# Patient Record
Sex: MALE | Race: BLACK OR AFRICAN AMERICAN | Employment: UNEMPLOYED | URBAN - METROPOLITAN AREA
[De-identification: names, ages, dates, MRNs, and addresses within clinical notes are randomized per-mention and may not be internally consistent; named-entity substitution may affect disease eponyms.]

---

## 2017-07-31 ENCOUNTER — HOSPITAL ENCOUNTER (OUTPATIENT)
Age: 77
Setting detail: SPECIMEN
Discharge: HOME | End: 2017-07-31
Payer: MEDICARE

## 2017-07-31 LAB
ANION GAP SERPL CALCULATED.3IONS-SCNC: 13 MMOL/L (ref 9–17)
BUN BLDV-MCNC: 55 MG/DL (ref 8–23)
BUN/CREAT BLD: 20 (ref 9–20)
CALCIUM SERPL-MCNC: 8.9 MG/DL (ref 8.6–10.4)
CHLORIDE BLD-SCNC: 103 MMOL/L (ref 98–107)
CO2: 28 MMOL/L (ref 20–31)
CREAT SERPL-MCNC: 2.71 MG/DL (ref 0.7–1.2)
GFR AFRICAN AMERICAN: 28 ML/MIN
GFR NON-AFRICAN AMERICAN: 23 ML/MIN
GFR SERPL CREATININE-BSD FRML MDRD: ABNORMAL ML/MIN/{1.73_M2}
GFR SERPL CREATININE-BSD FRML MDRD: ABNORMAL ML/MIN/{1.73_M2}
GLUCOSE BLD-MCNC: 106 MG/DL (ref 70–99)
HCT VFR BLD CALC: 29.6 % (ref 41–53)
HEMOGLOBIN: 9.7 G/DL (ref 13.5–17.5)
MAGNESIUM: 2.1 MG/DL (ref 1.6–2.6)
MCH RBC QN AUTO: 30.8 PG (ref 26–34)
MCHC RBC AUTO-ENTMCNC: 32.7 G/DL (ref 31–37)
MCV RBC AUTO: 94.4 FL (ref 80–100)
PDW BLD-RTO: 16.5 % (ref 11.5–14.5)
PLATELET # BLD: 158 K/UL (ref 130–400)
PMV BLD AUTO: 8.8 FL (ref 6–12)
POTASSIUM SERPL-SCNC: 4.3 MMOL/L (ref 3.7–5.3)
RBC # BLD: 3.14 M/UL (ref 4.5–5.9)
SODIUM BLD-SCNC: 144 MMOL/L (ref 135–144)
WBC # BLD: 6.1 K/UL (ref 3.5–11)

## 2017-07-31 PROCEDURE — 36415 COLL VENOUS BLD VENIPUNCTURE: CPT

## 2017-07-31 PROCEDURE — P9603 ONE-WAY ALLOW PRORATED MILES: HCPCS

## 2017-07-31 PROCEDURE — 85027 COMPLETE CBC AUTOMATED: CPT

## 2017-07-31 PROCEDURE — 83735 ASSAY OF MAGNESIUM: CPT

## 2017-07-31 PROCEDURE — 80048 BASIC METABOLIC PNL TOTAL CA: CPT

## 2017-08-07 ENCOUNTER — HOSPITAL ENCOUNTER (OUTPATIENT)
Age: 77
Setting detail: SPECIMEN
Discharge: HOME | End: 2017-08-07
Payer: MEDICARE

## 2018-02-18 ENCOUNTER — APPOINTMENT (OUTPATIENT)
Dept: GENERAL RADIOLOGY | Age: 78
DRG: 683 | End: 2018-02-18
Payer: MEDICARE

## 2018-02-18 ENCOUNTER — HOSPITAL ENCOUNTER (INPATIENT)
Age: 78
LOS: 1 days | Discharge: HOME | DRG: 683 | End: 2018-02-20
Attending: EMERGENCY MEDICINE | Admitting: EMERGENCY MEDICINE
Payer: MEDICARE

## 2018-02-18 DIAGNOSIS — N17.9 AKI (ACUTE KIDNEY INJURY) (HCC): ICD-10-CM

## 2018-02-18 DIAGNOSIS — R55 SYNCOPE AND COLLAPSE: Primary | ICD-10-CM

## 2018-02-18 LAB
-: NORMAL
ABSOLUTE EOS #: 0.22 K/UL (ref 0–0.44)
ABSOLUTE IMMATURE GRANULOCYTE: <0.03 K/UL (ref 0–0.3)
ABSOLUTE LYMPH #: 1.28 K/UL (ref 1.1–3.7)
ABSOLUTE MONO #: 0.88 K/UL (ref 0.1–1.2)
AMORPHOUS: NORMAL
ANION GAP SERPL CALCULATED.3IONS-SCNC: 17 MMOL/L (ref 9–17)
BACTERIA: NORMAL
BASOPHILS # BLD: 1 % (ref 0–2)
BASOPHILS ABSOLUTE: 0.03 K/UL (ref 0–0.2)
BILIRUBIN URINE: NEGATIVE
BNP INTERPRETATION: ABNORMAL
BUN BLDV-MCNC: 49 MG/DL (ref 8–23)
BUN/CREAT BLD: ABNORMAL (ref 9–20)
CALCIUM SERPL-MCNC: 8.3 MG/DL (ref 8.6–10.4)
CASTS UA: NORMAL /LPF (ref 0–8)
CHLORIDE BLD-SCNC: 107 MMOL/L (ref 98–107)
CO2: 17 MMOL/L (ref 20–31)
COLOR: YELLOW
COMMENT UA: ABNORMAL
CREAT SERPL-MCNC: 3.82 MG/DL (ref 0.7–1.2)
CRYSTALS, UA: NORMAL /HPF
DIFFERENTIAL TYPE: ABNORMAL
EOSINOPHILS RELATIVE PERCENT: 3 % (ref 1–4)
EPITHELIAL CELLS UA: NORMAL /HPF (ref 0–5)
GFR AFRICAN AMERICAN: 19 ML/MIN
GFR NON-AFRICAN AMERICAN: 15 ML/MIN
GFR SERPL CREATININE-BSD FRML MDRD: ABNORMAL ML/MIN/{1.73_M2}
GFR SERPL CREATININE-BSD FRML MDRD: ABNORMAL ML/MIN/{1.73_M2}
GLUCOSE BLD-MCNC: 104 MG/DL (ref 70–99)
GLUCOSE URINE: NEGATIVE
HCT VFR BLD CALC: 36.2 % (ref 40.7–50.3)
HEMOGLOBIN: 11.3 G/DL (ref 13–17)
IMMATURE GRANULOCYTES: 0 %
KETONES, URINE: NEGATIVE
LEUKOCYTE ESTERASE, URINE: NEGATIVE
LYMPHOCYTES # BLD: 20 % (ref 24–43)
MCH RBC QN AUTO: 30.3 PG (ref 25.2–33.5)
MCHC RBC AUTO-ENTMCNC: 31.2 G/DL (ref 28.4–34.8)
MCV RBC AUTO: 97.1 FL (ref 82.6–102.9)
MONOCYTES # BLD: 14 % (ref 3–12)
MUCUS: NORMAL
NITRITE, URINE: NEGATIVE
NRBC AUTOMATED: 0 PER 100 WBC
OTHER OBSERVATIONS UA: NORMAL
PDW BLD-RTO: 13.9 % (ref 11.8–14.4)
PH UA: 5 (ref 5–8)
PLATELET # BLD: 164 K/UL (ref 138–453)
PLATELET ESTIMATE: ABNORMAL
PMV BLD AUTO: 10.6 FL (ref 8.1–13.5)
POC TROPONIN I: 0.02 NG/ML (ref 0–0.1)
POC TROPONIN INTERP: NORMAL
POTASSIUM SERPL-SCNC: 4.6 MMOL/L (ref 3.7–5.3)
PRO-BNP: 1620 PG/ML
PROTEIN UA: ABNORMAL
RBC # BLD: 3.73 M/UL (ref 4.21–5.77)
RBC # BLD: ABNORMAL 10*6/UL
RBC UA: NORMAL /HPF (ref 0–4)
RENAL EPITHELIAL, UA: NORMAL /HPF
SEG NEUTROPHILS: 62 % (ref 36–65)
SEGMENTED NEUTROPHILS ABSOLUTE COUNT: 3.97 K/UL (ref 1.5–8.1)
SODIUM BLD-SCNC: 141 MMOL/L (ref 135–144)
SPECIFIC GRAVITY UA: 1.01 (ref 1–1.03)
TRICHOMONAS: NORMAL
TROPONIN INTERP: NORMAL
TROPONIN INTERP: NORMAL
TROPONIN T: <0.03 NG/ML
TROPONIN T: <0.03 NG/ML
TURBIDITY: CLEAR
URINE HGB: NEGATIVE
UROBILINOGEN, URINE: NORMAL
WBC # BLD: 6.4 K/UL (ref 3.5–11.3)
WBC # BLD: ABNORMAL 10*3/UL
WBC UA: NORMAL /HPF (ref 0–5)
YEAST: NORMAL

## 2018-02-18 PROCEDURE — 85025 COMPLETE CBC W/AUTO DIFF WBC: CPT

## 2018-02-18 PROCEDURE — 93005 ELECTROCARDIOGRAM TRACING: CPT

## 2018-02-18 PROCEDURE — 6370000000 HC RX 637 (ALT 250 FOR IP): Performed by: EMERGENCY MEDICINE

## 2018-02-18 PROCEDURE — 84484 ASSAY OF TROPONIN QUANT: CPT

## 2018-02-18 PROCEDURE — 94640 AIRWAY INHALATION TREATMENT: CPT

## 2018-02-18 PROCEDURE — 2580000003 HC RX 258: Performed by: EMERGENCY MEDICINE

## 2018-02-18 PROCEDURE — 81015 MICROSCOPIC EXAM OF URINE: CPT

## 2018-02-18 PROCEDURE — 99285 EMERGENCY DEPT VISIT HI MDM: CPT

## 2018-02-18 PROCEDURE — 83880 ASSAY OF NATRIURETIC PEPTIDE: CPT

## 2018-02-18 PROCEDURE — 36415 COLL VENOUS BLD VENIPUNCTURE: CPT

## 2018-02-18 PROCEDURE — G0378 HOSPITAL OBSERVATION PER HR: HCPCS

## 2018-02-18 PROCEDURE — 71046 X-RAY EXAM CHEST 2 VIEWS: CPT

## 2018-02-18 PROCEDURE — 94664 DEMO&/EVAL PT USE INHALER: CPT

## 2018-02-18 PROCEDURE — 81003 URINALYSIS AUTO W/O SCOPE: CPT

## 2018-02-18 PROCEDURE — 80048 BASIC METABOLIC PNL TOTAL CA: CPT

## 2018-02-18 RX ORDER — MAGNESIUM OXIDE 400 MG/1
400 TABLET ORAL DAILY
Status: DISCONTINUED | OUTPATIENT
Start: 2018-02-18 | End: 2018-02-20 | Stop reason: HOSPADM

## 2018-02-18 RX ORDER — IPRATROPIUM BROMIDE AND ALBUTEROL SULFATE 2.5; .5 MG/3ML; MG/3ML
1 SOLUTION RESPIRATORY (INHALATION) EVERY 4 HOURS
Status: DISCONTINUED | OUTPATIENT
Start: 2018-02-18 | End: 2018-02-19

## 2018-02-18 RX ORDER — CLOZAPINE 25 MG/1
25 TABLET ORAL 2 TIMES DAILY
COMMUNITY

## 2018-02-18 RX ORDER — DOCUSATE SODIUM 100 MG/1
100 CAPSULE, LIQUID FILLED ORAL 2 TIMES DAILY
Status: DISCONTINUED | OUTPATIENT
Start: 2018-02-18 | End: 2018-02-20 | Stop reason: HOSPADM

## 2018-02-18 RX ORDER — HYDROCHLOROTHIAZIDE 25 MG/1
25 TABLET ORAL DAILY
Status: DISCONTINUED | OUTPATIENT
Start: 2018-02-18 | End: 2018-02-19

## 2018-02-18 RX ORDER — SODIUM CHLORIDE 0.9 % (FLUSH) 0.9 %
10 SYRINGE (ML) INJECTION EVERY 12 HOURS SCHEDULED
Status: DISCONTINUED | OUTPATIENT
Start: 2018-02-18 | End: 2018-02-20 | Stop reason: HOSPADM

## 2018-02-18 RX ORDER — PANTOPRAZOLE SODIUM 40 MG/1
40 TABLET, DELAYED RELEASE ORAL
Status: DISCONTINUED | OUTPATIENT
Start: 2018-02-19 | End: 2018-02-20 | Stop reason: HOSPADM

## 2018-02-18 RX ORDER — GABAPENTIN 300 MG/1
300 CAPSULE ORAL 3 TIMES DAILY
Status: DISCONTINUED | OUTPATIENT
Start: 2018-02-18 | End: 2018-02-19

## 2018-02-18 RX ORDER — ACETAMINOPHEN 325 MG/1
650 TABLET ORAL EVERY 4 HOURS PRN
Status: DISCONTINUED | OUTPATIENT
Start: 2018-02-18 | End: 2018-02-20 | Stop reason: HOSPADM

## 2018-02-18 RX ORDER — LANOLIN ALCOHOL/MO/W.PET/CERES
325 CREAM (GRAM) TOPICAL 2 TIMES DAILY WITH MEALS
Status: DISCONTINUED | OUTPATIENT
Start: 2018-02-18 | End: 2018-02-20 | Stop reason: HOSPADM

## 2018-02-18 RX ORDER — FINASTERIDE 5 MG/1
5 TABLET, FILM COATED ORAL DAILY
Status: DISCONTINUED | OUTPATIENT
Start: 2018-02-18 | End: 2018-02-20 | Stop reason: HOSPADM

## 2018-02-18 RX ORDER — SIMVASTATIN 5 MG
5 TABLET ORAL NIGHTLY
Status: DISCONTINUED | OUTPATIENT
Start: 2018-02-18 | End: 2018-02-20 | Stop reason: HOSPADM

## 2018-02-18 RX ORDER — CARVEDILOL 12.5 MG/1
12.5 TABLET ORAL 2 TIMES DAILY WITH MEALS
Status: DISCONTINUED | OUTPATIENT
Start: 2018-02-18 | End: 2018-02-20

## 2018-02-18 RX ORDER — M-VIT,TX,IRON,MINS/CALC/FOLIC 27MG-0.4MG
1 TABLET ORAL DAILY
Status: DISCONTINUED | OUTPATIENT
Start: 2018-02-18 | End: 2018-02-20 | Stop reason: HOSPADM

## 2018-02-18 RX ORDER — 0.9 % SODIUM CHLORIDE 0.9 %
500 INTRAVENOUS SOLUTION INTRAVENOUS ONCE
Status: COMPLETED | OUTPATIENT
Start: 2018-02-18 | End: 2018-02-18

## 2018-02-18 RX ORDER — AMLODIPINE BESYLATE 10 MG/1
10 TABLET ORAL DAILY
Status: DISCONTINUED | OUTPATIENT
Start: 2018-02-18 | End: 2018-02-20 | Stop reason: HOSPADM

## 2018-02-18 RX ORDER — OXYCODONE HYDROCHLORIDE 5 MG/1
5 TABLET ORAL EVERY 4 HOURS PRN
Status: DISCONTINUED | OUTPATIENT
Start: 2018-02-18 | End: 2018-02-20 | Stop reason: HOSPADM

## 2018-02-18 RX ORDER — SODIUM CHLORIDE 0.9 % (FLUSH) 0.9 %
10 SYRINGE (ML) INJECTION PRN
Status: DISCONTINUED | OUTPATIENT
Start: 2018-02-18 | End: 2018-02-20 | Stop reason: HOSPADM

## 2018-02-18 RX ORDER — SENNA AND DOCUSATE SODIUM 50; 8.6 MG/1; MG/1
2 TABLET, FILM COATED ORAL DAILY
Status: DISCONTINUED | OUTPATIENT
Start: 2018-02-18 | End: 2018-02-20 | Stop reason: HOSPADM

## 2018-02-18 RX ADMIN — OXYCODONE HYDROCHLORIDE 5 MG: 5 TABLET ORAL at 19:41

## 2018-02-18 RX ADMIN — IPRATROPIUM BROMIDE AND ALBUTEROL SULFATE 3 ML: .5; 3 SOLUTION RESPIRATORY (INHALATION) at 16:18

## 2018-02-18 RX ADMIN — GABAPENTIN 300 MG: 300 CAPSULE ORAL at 21:00

## 2018-02-18 RX ADMIN — IPRATROPIUM BROMIDE AND ALBUTEROL SULFATE 3 ML: .5; 3 SOLUTION RESPIRATORY (INHALATION) at 21:19

## 2018-02-18 RX ADMIN — CARVEDILOL 12.5 MG: 12.5 TABLET, FILM COATED ORAL at 19:42

## 2018-02-18 RX ADMIN — SODIUM CHLORIDE 500 ML: 0.9 INJECTION, SOLUTION INTRAVENOUS at 13:21

## 2018-02-18 RX ADMIN — APIXABAN 5 MG: 5 TABLET, FILM COATED ORAL at 21:00

## 2018-02-18 RX ADMIN — SIMVASTATIN 5 MG: 5 TABLET, FILM COATED ORAL at 21:00

## 2018-02-18 RX ADMIN — Medication 10 ML: at 21:00

## 2018-02-18 RX ADMIN — FERROUS SULFATE TAB EC 325 MG (65 MG FE EQUIVALENT) 325 MG: 325 (65 FE) TABLET DELAYED RESPONSE at 19:41

## 2018-02-18 ASSESSMENT — ENCOUNTER SYMPTOMS
NAUSEA: 0
COLOR CHANGE: 0
SHORTNESS OF BREATH: 0
BACK PAIN: 0
ABDOMINAL PAIN: 0
VOMITING: 0

## 2018-02-18 ASSESSMENT — PAIN SCALES - GENERAL: PAINLEVEL_OUTOF10: 7

## 2018-02-18 NOTE — ED PROVIDER NOTES
[facility]  ED  Emergency Department Encounter  Emergency Medicine Resident     Pt Name: Cara Landeros  MRN: [id number]  [place] 1940  Date of evaluation: 2/18/18  PCP:  No primary care provider on file. CHIEF COMPLAINT       Chief Complaint   Patient presents with    Loss of Consciousness     reports that he had a syncopal episode while at [address] [facility]  (Location/Symptom, Timing/Onset, Context/Setting, Quality, Duration, Modifying Factors, Severity.)      Cara Landeros is a 69 y.o. male who presents with Syncopal episode. Patient was brought in by EMS. Patient states he was at [employer] when he passed out. Patient states he was sitting down and slumped over. He does not know how long he was out for. He didn't fall to the ground or hit his head. Patient denies having any prodromal symptoms prior to passing out. He denies any chest pain, shortness of breath, lightheadedness, palpitations or diaphoresis. He denies any complaints currently. No nausea or vomiting. Patient does have a history of CHF and states he takes water pills. States he has been fluid restricted. He follows up with cardiology. States he does have a stent in and has been taking his meds. He currently denies any headache, blurry vision, neck pain, numbness, weakness, tingling in his extremities. Patient states he has passed out before and he was told it was because he was dehydrated. He denies any leg pain or leg swelling. PAST MEDICAL / SURGICAL / SOCIAL / FAMILY HISTORY      has a past medical history of Atrial fibrillation, chronic (HCC); BPH (benign prostatic hyperplasia); Hyperlipidemia; Hypertension; and Myocardial infarct, old.     has no past surgical history on file. Social History     Social History    Marital status:      Spouse name: N/A    Number of children: N/A    Years of education: N/A     Occupational History    Not on file.      Social History Main Topics    Smoking status: Former Smoker    Smokeless tobacco: Not on file    Alcohol use No    Drug use: No    Sexual activity: Not on file     Other Topics Concern    Not on file     Social History Narrative    No narrative on file       History reviewed. No pertinent family history. Allergies:  Review of patient's allergies indicates no known allergies. Home Medications:  Prior to Admission medications    Medication Sig Start Date End Date Taking?  Authorizing Provider   oxyCODONE (ROXICODONE) 5 MG immediate release tablet Take 1 tablet by mouth every 4 hours as needed for Pain 3/2/16   Emilia Lester MD   ferrous sulfate 325 (65 FE) MG tablet Take 1 tablet by mouth 2 times daily (with meals) 3/2/16   Emilia Lester MD   amLODIPine (NORVASC) 10 MG tablet Take 10 mg by mouth daily    Historical Provider, MD   brimonidine (ALPHAGAN P) 0.1 % SOLN 1 drop 2 times daily    Historical Provider, MD   carvedilol (COREG) 12.5 MG tablet Take 12.5 mg by mouth 2 times daily (with meals)    Historical Provider, MD   finasteride (PROSCAR) 5 MG tablet Take 5 mg by mouth daily    Historical Provider, MD   gabapentin (NEURONTIN) 300 MG capsule Take 300 mg by mouth 3 times daily    Historical Provider, MD   magnesium oxide (MAG-OX) 400 MG tablet Take 400 mg by mouth daily    Historical Provider, MD   latanoprost (XALATAN) 0.005 % ophthalmic solution 1 drop nightly    Historical Provider, MD   Multiple Vitamins-Minerals (THERAPEUTIC MULTIVITAMIN-MINERALS) tablet Take 1 tablet by mouth daily    Historical Provider, MD   pantoprazole sodium (PROTONIX) 40 MG PACK packet Take 40 mg by mouth every morning (before breakfast)    Historical Provider, MD   senna-docusate (Sonia Earnest) 8.6-50 MG per tablet Take 2 tablets by mouth daily    Historical Provider, MD   ipratropium-albuterol (DUONEB) 0.5-2.5 (3) MG/3ML SOLN nebulizer solution Take 1 vial by nebulization every 4 hours    Historical Provider, MD   simvastatin (ZOCOR) 5 MG GFR Staging NOT REPORTED    CBC Auto Differential   Result Value Ref Range    WBC 6.4 3.5 - 11.3 k/uL    RBC 3.73 (L) 4.21 - 5.77 m/uL    Hemoglobin 11.3 (L) 13.0 - 17.0 g/dL    Hematocrit 36.2 (L) 40.7 - 50.3 %    MCV 97.1 82.6 - 102.9 fL    MCH 30.3 25.2 - 33.5 pg    MCHC 31.2 28.4 - 34.8 g/dL    RDW 13.9 11.8 - 14.4 %    Platelets 835 439 - 280 k/uL    MPV 10.6 8.1 - 13.5 fL    NRBC Automated 0.0 0.0 per 100 WBC    Differential Type NOT REPORTED     Seg Neutrophils 62 36 - 65 %    Lymphocytes 20 (L) 24 - 43 %    Monocytes 14 (H) 3 - 12 %    Eosinophils % 3 1 - 4 %    Basophils 1 0 - 2 %    Immature Granulocytes 0 0 %    Segs Absolute 3.97 1.50 - 8.10 k/uL    Absolute Lymph # 1.28 1.10 - 3.70 k/uL    Absolute Mono # 0.88 0.10 - 1.20 k/uL    Absolute Eos # 0.22 0.00 - 0.44 k/uL    Basophils # 0.03 0.00 - 0.20 k/uL    Absolute Immature Granulocyte <0.03 0.00 - 0.30 k/uL    WBC Morphology NOT REPORTED     RBC Morphology NOT REPORTED     Platelet Estimate NOT REPORTED    Urinalysis   Result Value Ref Range    Color, UA YELLOW YEL    Turbidity UA CLEAR CLEAR    Glucose, Ur NEGATIVE NEG    Bilirubin Urine NEGATIVE NEG    Ketones, Urine NEGATIVE NEG    Specific Gravity, UA 1.008 1.005 - 1.030    Urine Hgb NEGATIVE NEG    pH, UA 5.0 5.0 - 8.0    Protein, UA TRACE (A) NEG    Urobilinogen, Urine Normal NORM    Nitrite, Urine NEGATIVE NEG    Leukocyte Esterase, Urine NEGATIVE NEG    Urinalysis Comments       Microscopic exam not performed based on chemical results unless requested in   Brain Natriuretic Peptide   Result Value Ref Range    Pro-BNP 1,620 (H) <300 pg/mL    BNP Interpretation         URINALYSIS, MICRO   Result Value Ref Range    -          WBC, UA None 0 - 5 /HPF    RBC, UA None 0 - 4 /HPF    Casts UA 2 TO 5 HYALINE 0 - 8 /LPF    Crystals UA NOT REPORTED NONE /HPF    Epithelial Cells UA None 0 - 5 /HPF    Renal Epithelial, Urine NOT REPORTED 0 /HPF    Bacteria, UA NOT REPORTED NONE    Mucus, UA NOT REPORTED change  T Waves: Nonspecific changes  Q Waves: none    Clinical Impression: Sinus bradycardia    EMERGENCY DEPARTMENT COURSE:  Patient reassessed and updated on results and plan to admit. Patient was understanding. He sees cardiology at the [facility] clinic. We'll consult our cardiologist regarding patient. PROCEDURES:  None    CONSULTS:  IP CONSULT TO CARDIOLOGY    CRITICAL CARE:  None    FINAL IMPRESSION      1. Syncope and collapse    2. SARA (acute kidney injury) ([address]          DISPOSITION / PLAN     DISPOSITION   decision to admit      PATIENT REFERRED TO:  No follow-up provider specified.     DISCHARGE MEDICATIONS:  New Prescriptions    No medications on file       Duglas Jackson MD  Emergency Medicine Resident    (Please note that portions of this note were completed with a voice recognition program.  Efforts were made to edit the dictations but occasionally words are mis-transcribed.)        Duglas Jackson MD  Resident  02/18/18 [id number]

## 2018-02-19 ENCOUNTER — APPOINTMENT (OUTPATIENT)
Dept: ULTRASOUND IMAGING | Age: 78
DRG: 683 | End: 2018-02-19
Payer: MEDICARE

## 2018-02-19 LAB
ANION GAP SERPL CALCULATED.3IONS-SCNC: 16 MMOL/L (ref 9–17)
BUN BLDV-MCNC: 49 MG/DL (ref 8–23)
BUN/CREAT BLD: ABNORMAL (ref 9–20)
CALCIUM SERPL-MCNC: 8.2 MG/DL (ref 8.6–10.4)
CHLORIDE BLD-SCNC: 108 MMOL/L (ref 98–107)
CO2: 18 MMOL/L (ref 20–31)
CREAT SERPL-MCNC: 3.48 MG/DL (ref 0.7–1.2)
EKG ATRIAL RATE: 59 BPM
EKG ATRIAL RATE: 59 BPM
EKG ATRIAL RATE: 63 BPM
EKG P AXIS: 28 DEGREES
EKG P AXIS: 36 DEGREES
EKG P AXIS: 48 DEGREES
EKG P-R INTERVAL: 168 MS
EKG P-R INTERVAL: 170 MS
EKG P-R INTERVAL: 210 MS
EKG Q-T INTERVAL: 406 MS
EKG Q-T INTERVAL: 442 MS
EKG Q-T INTERVAL: 442 MS
EKG QRS DURATION: 94 MS
EKG QRS DURATION: 96 MS
EKG QRS DURATION: 98 MS
EKG QTC CALCULATION (BAZETT): 401 MS
EKG QTC CALCULATION (BAZETT): 437 MS
EKG QTC CALCULATION (BAZETT): 452 MS
EKG R AXIS: 12 DEGREES
EKG R AXIS: 16 DEGREES
EKG R AXIS: 2 DEGREES
EKG T AXIS: 12 DEGREES
EKG T AXIS: 23 DEGREES
EKG T AXIS: 31 DEGREES
EKG VENTRICULAR RATE: 59 BPM
EKG VENTRICULAR RATE: 59 BPM
EKG VENTRICULAR RATE: 63 BPM
GFR AFRICAN AMERICAN: 21 ML/MIN
GFR NON-AFRICAN AMERICAN: 17 ML/MIN
GFR SERPL CREATININE-BSD FRML MDRD: ABNORMAL ML/MIN/{1.73_M2}
GFR SERPL CREATININE-BSD FRML MDRD: ABNORMAL ML/MIN/{1.73_M2}
GLUCOSE BLD-MCNC: 95 MG/DL (ref 70–99)
POTASSIUM SERPL-SCNC: 4.6 MMOL/L (ref 3.7–5.3)
SODIUM BLD-SCNC: 142 MMOL/L (ref 135–144)
TROPONIN INTERP: NORMAL
TROPONIN INTERP: NORMAL
TROPONIN T: <0.03 NG/ML
TROPONIN T: <0.03 NG/ML

## 2018-02-19 PROCEDURE — 2580000003 HC RX 258: Performed by: EMERGENCY MEDICINE

## 2018-02-19 PROCEDURE — 6370000000 HC RX 637 (ALT 250 FOR IP): Performed by: INTERNAL MEDICINE

## 2018-02-19 PROCEDURE — 1200000000 HC SEMI PRIVATE

## 2018-02-19 PROCEDURE — 94640 AIRWAY INHALATION TREATMENT: CPT

## 2018-02-19 PROCEDURE — 93005 ELECTROCARDIOGRAM TRACING: CPT

## 2018-02-19 PROCEDURE — 84484 ASSAY OF TROPONIN QUANT: CPT

## 2018-02-19 PROCEDURE — 36415 COLL VENOUS BLD VENIPUNCTURE: CPT

## 2018-02-19 PROCEDURE — 6370000000 HC RX 637 (ALT 250 FOR IP): Performed by: EMERGENCY MEDICINE

## 2018-02-19 PROCEDURE — 94762 N-INVAS EAR/PLS OXIMTRY CONT: CPT

## 2018-02-19 PROCEDURE — 76770 US EXAM ABDO BACK WALL COMP: CPT

## 2018-02-19 PROCEDURE — 2580000003 HC RX 258: Performed by: INTERNAL MEDICINE

## 2018-02-19 PROCEDURE — 80048 BASIC METABOLIC PNL TOTAL CA: CPT

## 2018-02-19 RX ORDER — DEXTROSE AND SODIUM CHLORIDE 5; .9 G/100ML; G/100ML
INJECTION, SOLUTION INTRAVENOUS CONTINUOUS
Status: ACTIVE | OUTPATIENT
Start: 2018-02-19 | End: 2018-02-19

## 2018-02-19 RX ORDER — IPRATROPIUM BROMIDE AND ALBUTEROL SULFATE 2.5; .5 MG/3ML; MG/3ML
1 SOLUTION RESPIRATORY (INHALATION) PRN
Status: DISCONTINUED | OUTPATIENT
Start: 2018-02-19 | End: 2018-02-19

## 2018-02-19 RX ORDER — HYDRALAZINE HYDROCHLORIDE 10 MG/1
10 TABLET, FILM COATED ORAL ONCE
Status: COMPLETED | OUTPATIENT
Start: 2018-02-20 | End: 2018-02-20

## 2018-02-19 RX ORDER — GABAPENTIN 100 MG/1
100 CAPSULE ORAL 3 TIMES DAILY
Status: DISCONTINUED | OUTPATIENT
Start: 2018-02-19 | End: 2018-02-20 | Stop reason: HOSPADM

## 2018-02-19 RX ORDER — IPRATROPIUM BROMIDE AND ALBUTEROL SULFATE 2.5; .5 MG/3ML; MG/3ML
1 SOLUTION RESPIRATORY (INHALATION) EVERY 4 HOURS PRN
Status: DISCONTINUED | OUTPATIENT
Start: 2018-02-19 | End: 2018-02-20 | Stop reason: HOSPADM

## 2018-02-19 RX ADMIN — FERROUS SULFATE TAB EC 325 MG (65 MG FE EQUIVALENT) 325 MG: 325 (65 FE) TABLET DELAYED RESPONSE at 17:45

## 2018-02-19 RX ADMIN — CARVEDILOL 12.5 MG: 12.5 TABLET, FILM COATED ORAL at 17:45

## 2018-02-19 RX ADMIN — OXYCODONE HYDROCHLORIDE 5 MG: 5 TABLET ORAL at 02:23

## 2018-02-19 RX ADMIN — PANTOPRAZOLE SODIUM 40 MG: 40 TABLET, DELAYED RELEASE ORAL at 08:09

## 2018-02-19 RX ADMIN — DOCUSATE SODIUM -SENNOSIDES 2 TABLET: 50; 8.6 TABLET, COATED ORAL at 08:06

## 2018-02-19 RX ADMIN — Medication 10 ML: at 08:08

## 2018-02-19 RX ADMIN — OXYCODONE HYDROCHLORIDE 5 MG: 5 TABLET ORAL at 20:26

## 2018-02-19 RX ADMIN — HYDROCHLOROTHIAZIDE 25 MG: 25 TABLET ORAL at 08:07

## 2018-02-19 RX ADMIN — GABAPENTIN 100 MG: 100 CAPSULE ORAL at 14:42

## 2018-02-19 RX ADMIN — MAGNESIUM OXIDE 400 MG: 400 TABLET ORAL at 08:09

## 2018-02-19 RX ADMIN — IPRATROPIUM BROMIDE AND ALBUTEROL SULFATE 3 ML: .5; 3 SOLUTION RESPIRATORY (INHALATION) at 15:03

## 2018-02-19 RX ADMIN — DOCUSATE SODIUM 100 MG: 100 CAPSULE ORAL at 20:26

## 2018-02-19 RX ADMIN — GABAPENTIN 100 MG: 100 CAPSULE ORAL at 20:25

## 2018-02-19 RX ADMIN — AMLODIPINE BESYLATE 10 MG: 10 TABLET ORAL at 11:46

## 2018-02-19 RX ADMIN — FINASTERIDE 5 MG: 5 TABLET, FILM COATED ORAL at 08:07

## 2018-02-19 RX ADMIN — OXYCODONE HYDROCHLORIDE 5 MG: 5 TABLET ORAL at 08:12

## 2018-02-19 RX ADMIN — IPRATROPIUM BROMIDE AND ALBUTEROL SULFATE 3 ML: .5; 3 SOLUTION RESPIRATORY (INHALATION) at 08:43

## 2018-02-19 RX ADMIN — DEXTROSE AND SODIUM CHLORIDE: 5; 900 INJECTION, SOLUTION INTRAVENOUS at 14:19

## 2018-02-19 RX ADMIN — FERROUS SULFATE TAB EC 325 MG (65 MG FE EQUIVALENT) 325 MG: 325 (65 FE) TABLET DELAYED RESPONSE at 08:07

## 2018-02-19 RX ADMIN — SIMVASTATIN 5 MG: 5 TABLET, FILM COATED ORAL at 20:26

## 2018-02-19 RX ADMIN — DOCUSATE SODIUM 100 MG: 100 CAPSULE ORAL at 08:07

## 2018-02-19 RX ADMIN — APIXABAN 5 MG: 5 TABLET, FILM COATED ORAL at 08:06

## 2018-02-19 RX ADMIN — CARVEDILOL 12.5 MG: 12.5 TABLET, FILM COATED ORAL at 11:46

## 2018-02-19 RX ADMIN — OXYCODONE HYDROCHLORIDE 5 MG: 5 TABLET ORAL at 14:42

## 2018-02-19 RX ADMIN — IPRATROPIUM BROMIDE AND ALBUTEROL SULFATE 3 ML: .5; 3 SOLUTION RESPIRATORY (INHALATION) at 21:33

## 2018-02-19 RX ADMIN — GABAPENTIN 300 MG: 300 CAPSULE ORAL at 08:07

## 2018-02-19 RX ADMIN — MULTIPLE VITAMINS W/ MINERALS TAB 1 TABLET: TAB at 08:06

## 2018-02-19 ASSESSMENT — PAIN DESCRIPTION - ORIENTATION
ORIENTATION: LEFT;MID
ORIENTATION: LEFT;MID
ORIENTATION_2: LOWER

## 2018-02-19 ASSESSMENT — PAIN DESCRIPTION - LOCATION
LOCATION: ABDOMEN
LOCATION: ABDOMEN
LOCATION_2: BACK

## 2018-02-19 ASSESSMENT — PAIN DESCRIPTION - DIRECTION
RADIATING_TOWARDS: BACK
RADIATING_TOWARDS_2: RT LEG
RADIATING_TOWARDS: BACK

## 2018-02-19 ASSESSMENT — PAIN SCALES - GENERAL
PAINLEVEL_OUTOF10: 8
PAINLEVEL_OUTOF10: 7
PAINLEVEL_OUTOF10: 4
PAINLEVEL_OUTOF10: 5

## 2018-02-19 ASSESSMENT — PAIN DESCRIPTION - INTENSITY: RATING_2: 8

## 2018-02-19 ASSESSMENT — PAIN DESCRIPTION - FREQUENCY: FREQUENCY: CONTINUOUS

## 2018-02-19 ASSESSMENT — PAIN DESCRIPTION - DESCRIPTORS: DESCRIPTORS: STABBING;SHARP

## 2018-02-19 NOTE — CONSULTS
[facility]  In PatientCardiology Consult             Date:   2/19/2018  Patient name: Claudia Baldwin  Date of admission:  2/18/2018 12:09 PM  MRN:   [id number]  YOB: 1940      Reason for Admission: Loss of consciousness     CHIEF COMPLAINT: Loss of consciousness      History Obtained From:  Patient and medical records. HISTORY OF PRESENT ILLNESS:      Gama Francis is a 69year old with Syncopal episode. Patient was brought in by EMS. Patient states he was at [employer] when he passed out. Patient states he was sitting down and slumped over. He does not know how long he was out for. He didn't fall to the ground or hit his head. Patient denies having any prodromal symptoms prior to passing out. He denies any chest pain, shortness of breath, lightheadedness, palpitations or diaphoresis. He denies any complaints currently. No nausea or vomiting. Patient does have a history of CHF and states he takes water pills. States he has been fluid restricted. He follows up with cardiology. States he does have a stent in and has been taking his meds. He currently denies any headache, blurry vision, neck pain, numbness, weakness, tingling in his extremities. Patient states he has passed out before and he was told it was because he was dehydrated. He denies any leg pain or leg swelling. Past Medical History:   has a past medical history of Atrial fibrillation, chronic (HCC); BPH (benign prostatic hyperplasia); Hyperlipidemia; Hypertension; and Myocardial infarct, old. Past Surgical History:   has no past surgical history on file. Home Medications:    Prior to Admission medications    Medication Sig Start Date End Date Taking?  Authorizing Provider   aspirin 81 MG tablet Take 81 mg by mouth daily   Yes Historical Provider, MD   cloZAPine (CLOZARIL) 25 MG tablet Take 25 mg by mouth 2 times daily   Yes Historical Provider, MD   oxyCODONE (ROXICODONE) 5 MG immediate release tablet reports that he has quit smoking. He does not have any smokeless tobacco history on file. He reports that he does not drink alcohol or use drugs. Family History: family history is not on file. No h/o sudden cardiac death. No for premature CAD      REVIEW OF SYSTEMS:    · Constitutional: there has been no unanticipated weight loss. There's been No change in energy level, No change in activity level. · Eyes: No visual changes or diplopia. No scleral icterus. · ENT: No Headaches, hearing loss or vertigo. No mouth sores or sore throat. · Cardiovascular: As above. · Respiratory: As above. · Gastrointestinal: No abdominal pain, appetite loss, blood in stools. No change in bowel or bladder habits. · Genitourinary: No dysuria, trouble voiding, or hematuria. · Musculoskeletal:  No gait disturbance, No weakness or joint complaints. · Integumentary: No rash or pruritis. · Neurological: No headache, diplopia, change in muscle strength, numbness or tingling. No change in gait, balance, coordination, mood, affect, memory, mentation, behavior. · Psychiatric: No anxiety, or depression. · Endocrine: No temperature intolerance. No excessive thirst, fluid intake, or urination. No tremor. · Hematologic/Lymphatic: No abnormal bruising or bleeding, blood clots or swollen lymph nodes. · Allergic/Immunologic: No nasal congestion or hives. PHYSICAL EXAM:    Physical Examination:    BP (!) 190/78   Pulse 56   Temp 98.3 °F (36.8 °C) (Oral)   Resp 20   Ht 5' 7\" (1.702 m)   Wt 213 lb (96.6 kg)   SpO2 99%   BMI 33.36 kg/m²    Constitutional and General Appearance: alert, cooperative, no distress and appears stated age  HEENT: PERRL, no cervical lymphadenopathy. No masses palpable. Normal oral mucosa  Respiratory:  · Normal excursion and expansion without use of accessory muscles  · Resp Auscultation: Good respiratory effort. No for increased work of breathing.  On auscultation: Clear to symptomatic. 4. Nephrology evaluation. 5. Follow up with primary Cardiology. Discussed with patient and nursing.     Electronically signed by Sari Joy MD on 2/19/2018 at [address] [place] [address] [facility]  [phone]

## 2018-02-19 NOTE — H&P
[facility]  CDU / OBSERVATION eNCOUnter  Resident Note     Pt Name: Марина Tidwell  MRN: [id number]  [place] 1940  Date of evaluation: 2/19/18  Patient's PCP is : No primary care provider on file. CHIEF COMPLAINT       Chief Complaint   Patient presents with    Loss of Consciousness     reports that he had a syncopal episode while at [address] is a 69 y.o. male who presents After having a syncopal episode while at [employer]. Patient states that the last thing he remembers was standing up singing at [employer] and then he is being wheeled into the ambulance. Patient denies any pain at this time and notes that his physician up at the [facility] in [place] had recently adjusted his diuretic medications and that he has been noticing he has been lightheaded/dizzy more frequently over the past 2 weeks. Location/Symptom: Global  Timing/Onset: Acute  Provocation: Standing/positional  Quality: N/A  Radiation: N/A  Severity: N/A  Timing/Duration: Syncopal episode lasted minutes    Modifying Factors: Standing/position    REVIEW OF SYSTEMS       General ROS - No fevers, No malaise  Ophthalmic ROS - No discharge, No changes in vision  ENT ROS -  No sore throat, No rhinorrhea,   Respiratory ROS - no shortness of breath, no cough, no  wheezing  Cardiovascular ROS - No chest pain, no dyspnea on exertion  Gastrointestinal ROS - No abdominal pain, no nausea or vomiting, no change in bowel habits, no black or bloody stools  Genito-Urinary ROS - No dysuria, trouble voiding, or hematuria  Musculoskeletal ROS - No myalgias, No arthalgias  Neurological ROS - No headache, dizziness/lightheadednessCurrently resolved, No focal weakness, no loss of sensation  Dermatological ROS - No lesions, No rash     (PQRS) Advance directives on face sheet per hospital policy.  No change unless specifically mentioned in chart    PAST MEDICAL HISTORY    has a past medical history of CONSULTS:    IP CONSULT TO CARDIOLOGY  IP CONSULT TO NEPHROLOGY    PROCEDURES:  Not indicated       PATIENT REFERRED TO:    No follow-up provider specified. --  Nivia Baldwin,    Emergency Medicine Resident     This dictation was generated by voice recognition computer software. Although all attempts are made to edit the dictation for accuracy, there may be errors in the transcription that are not intended.

## 2018-02-19 NOTE — CONSULTS
disease or vasculitis. No history of dysuria or frequency. No recent procedures involving IV contrast. There is no hx of paraprotein disease. Pt denies any hx of recurrent UTI , incontinence or recurrent nephrolithiasis. Medication review shows use of diuretics. Past Medical History:        Diagnosis Date    Atrial fibrillation, chronic (HCC)     BPH (benign prostatic hyperplasia)     Hyperlipidemia     Hypertension     Myocardial infarct, old        Past Surgical History:    History reviewed. No pertinent surgical history. Current Medications:      gabapentin (NEURONTIN) capsule 100 mg TID   dextrose 5 % and 0.9 % sodium chloride infusion Continuous   amLODIPine (NORVASC) tablet 10 mg Daily   apixaban (ELIQUIS) tablet 5 mg BID   carvedilol (COREG) tablet 12.5 mg BID WC   docusate sodium (COLACE) capsule 100 mg BID   ferrous sulfate EC tablet 325 mg BID WC   finasteride (PROSCAR) tablet 5 mg Daily   ipratropium-albuterol (DUONEB) nebulizer solution 3 mL Q4H   magnesium oxide (MAG-OX) tablet 400 mg Daily   therapeutic multivitamin-minerals 1 tablet Daily   oxyCODONE (ROXICODONE) immediate release tablet 5 mg Q4H PRN   pantoprazole (PROTONIX) tablet 40 mg QAM AC   sennosides-docusate sodium (SENOKOT-S) 8.6-50 MG tablet 2 tablet Daily   simvastatin (ZOCOR) tablet 5 mg Nightly   sodium chloride flush 0.9 % injection 10 mL 2 times per day   sodium chloride flush 0.9 % injection 10 mL PRN   acetaminophen (TYLENOL) tablet 650 mg Q4H PRN       Allergies:  Review of patient's allergies indicates no known allergies. Social History:   Social History     Social History    Marital status:      Spouse name: N/A    Number of children: N/A    Years of education: N/A     Occupational History    Not on file.      Social History Main Topics    Smoking status: Former Smoker    Smokeless tobacco: Not on file    Alcohol use No    Drug use: No    Sexual activity: Not on file     Other Topics Concern    Not on file     Social History Narrative    No narrative on file       Family History:   History reviewed. No pertinent family history. Review of Systems:    Constitutional: No fever, no chills, no lethargy, He's had some intermittent lightheadedness  HEENT:  No headache, otalgia, itchy eyes, nasal discharge or sore throat. Cardiac:  No chest pain, dyspnea, orthopnea or PND. Chest:              No cough, phlegm or wheezing. Abdomen:  No abdominal pain, nausea or vomiting. Neuro:  No focal weakness, abnormal movements orseizure like activity. Skin:   No rashes, no itching. :   No hematuria, no pyuria, no dysuria, no flank pain. Extremities:  No swelling or joint pains. Objective:  CURRENT TEMPERATURE:  Temp: 98.3 °F (36.8 °C)  MAXIMUM TEMPERATURE OVER 24HRS:  Temp (24hrs), Av.9 °F (36.6 °C), Min:97.5 °F (36.4 °C), Max:98.3 °F (36.8 °C)    CURRENT RESPIRATORY RATE:  Resp: 20  CURRENT PULSE:  Pulse: 53  CURRENT BLOOD PRESSURE:  BP: (!) 167/64  24HR BLOOD PRESSURE RANGE:  Systolic (99USI), KGM:375 , Min:135 , EOY:017   ; Diastolic (18MMF), HYR:89, Min:60, Max:78    24HR INTAKE/OUTPUT:  No intake or output data in the 24 hours ending 18 1401  Patient Vitals for the past 96 hrs (Last 3 readings):   Weight   18 1525 213 lb (96.6 kg)   18 1212 200 lb (90.7 kg)     Physical Exam:  General appearance:Awake, alert, in no acute distress  Skin: warm and dry, no rash or erythema  Eyes: conjunctivae normal and sclera anicteric  ENT: :no thrush no pharyngeal congestion    Neck: no carotid bruit ,no  JVD,no carotid Lymphadenopathy, noThyromegaly   Pulmonary: no wheezing or rhonchi. No rales heard.   Cardiovascular: Normal S1 & S2,  No S3 or  S4, no Pericardial Rub no Murmur   Abdomen: soft nontender, bowel sounds present, no organomegaly,  no ascites  Extremities:no cyanosis, clubbing or edema    Labs:   CBC:  Recent Labs      02/18/18   1226   WBC  6.4   RBC  3.73*   HGB  11.3*   HCT  36.2*   MCV

## 2018-02-20 VITALS
DIASTOLIC BLOOD PRESSURE: 68 MMHG | TEMPERATURE: 98.8 F | SYSTOLIC BLOOD PRESSURE: 189 MMHG | HEART RATE: 53 BPM | RESPIRATION RATE: 14 BRPM | HEIGHT: 67 IN | BODY MASS INDEX: 31.72 KG/M2 | WEIGHT: 202.1 LBS | OXYGEN SATURATION: 97 %

## 2018-02-20 LAB
ANION GAP SERPL CALCULATED.3IONS-SCNC: 14 MMOL/L (ref 9–17)
BUN BLDV-MCNC: 44 MG/DL (ref 8–23)
BUN/CREAT BLD: ABNORMAL (ref 9–20)
CALCIUM SERPL-MCNC: 8.1 MG/DL (ref 8.6–10.4)
CHLORIDE BLD-SCNC: 107 MMOL/L (ref 98–107)
CO2: 19 MMOL/L (ref 20–31)
CREAT SERPL-MCNC: 2.79 MG/DL (ref 0.7–1.2)
GFR AFRICAN AMERICAN: 27 ML/MIN
GFR NON-AFRICAN AMERICAN: 22 ML/MIN
GFR SERPL CREATININE-BSD FRML MDRD: ABNORMAL ML/MIN/{1.73_M2}
GFR SERPL CREATININE-BSD FRML MDRD: ABNORMAL ML/MIN/{1.73_M2}
GLUCOSE BLD-MCNC: 99 MG/DL (ref 70–99)
POTASSIUM SERPL-SCNC: 4.1 MMOL/L (ref 3.7–5.3)
SODIUM BLD-SCNC: 140 MMOL/L (ref 135–144)

## 2018-02-20 PROCEDURE — 6370000000 HC RX 637 (ALT 250 FOR IP): Performed by: EMERGENCY MEDICINE

## 2018-02-20 PROCEDURE — 94762 N-INVAS EAR/PLS OXIMTRY CONT: CPT

## 2018-02-20 PROCEDURE — 80048 BASIC METABOLIC PNL TOTAL CA: CPT

## 2018-02-20 PROCEDURE — 2580000003 HC RX 258: Performed by: EMERGENCY MEDICINE

## 2018-02-20 PROCEDURE — 6370000000 HC RX 637 (ALT 250 FOR IP): Performed by: INTERNAL MEDICINE

## 2018-02-20 PROCEDURE — 36415 COLL VENOUS BLD VENIPUNCTURE: CPT

## 2018-02-20 PROCEDURE — 6370000000 HC RX 637 (ALT 250 FOR IP): Performed by: STUDENT IN AN ORGANIZED HEALTH CARE EDUCATION/TRAINING PROGRAM

## 2018-02-20 RX ORDER — SPIRONOLACTONE 25 MG/1
12.5 TABLET ORAL DAILY
Qty: 30 TABLET | Refills: 0 | Status: SHIPPED | OUTPATIENT
Start: 2018-02-20

## 2018-02-20 RX ORDER — SPIRONOLACTONE 25 MG/1
12.5 TABLET ORAL DAILY
Status: DISCONTINUED | OUTPATIENT
Start: 2018-02-20 | End: 2018-02-20 | Stop reason: HOSPADM

## 2018-02-20 RX ORDER — CARVEDILOL 6.25 MG/1
6.25 TABLET ORAL 2 TIMES DAILY WITH MEALS
Status: DISCONTINUED | OUTPATIENT
Start: 2018-02-20 | End: 2018-02-20 | Stop reason: HOSPADM

## 2018-02-20 RX ORDER — CARVEDILOL 6.25 MG/1
6.25 TABLET ORAL 2 TIMES DAILY WITH MEALS
Qty: 60 TABLET | Refills: 1 | Status: SHIPPED | OUTPATIENT
Start: 2018-02-20

## 2018-02-20 RX ORDER — CARVEDILOL 12.5 MG/1
6.25 TABLET ORAL 2 TIMES DAILY WITH MEALS
Qty: 60 TABLET | Refills: 3 | Status: SHIPPED | OUTPATIENT
Start: 2018-02-20 | End: 2018-02-20 | Stop reason: HOSPADM

## 2018-02-20 RX ADMIN — FERROUS SULFATE TAB EC 325 MG (65 MG FE EQUIVALENT) 325 MG: 325 (65 FE) TABLET DELAYED RESPONSE at 17:24

## 2018-02-20 RX ADMIN — FERROUS SULFATE TAB EC 325 MG (65 MG FE EQUIVALENT) 325 MG: 325 (65 FE) TABLET DELAYED RESPONSE at 08:09

## 2018-02-20 RX ADMIN — DOCUSATE SODIUM -SENNOSIDES 2 TABLET: 50; 8.6 TABLET, COATED ORAL at 08:08

## 2018-02-20 RX ADMIN — DOCUSATE SODIUM 100 MG: 100 CAPSULE ORAL at 08:09

## 2018-02-20 RX ADMIN — OXYCODONE HYDROCHLORIDE 5 MG: 5 TABLET ORAL at 08:23

## 2018-02-20 RX ADMIN — OXYCODONE HYDROCHLORIDE 5 MG: 5 TABLET ORAL at 03:32

## 2018-02-20 RX ADMIN — Medication 10 ML: at 08:08

## 2018-02-20 RX ADMIN — MULTIPLE VITAMINS W/ MINERALS TAB 1 TABLET: TAB at 08:10

## 2018-02-20 RX ADMIN — GABAPENTIN 100 MG: 100 CAPSULE ORAL at 14:00

## 2018-02-20 RX ADMIN — GABAPENTIN 100 MG: 100 CAPSULE ORAL at 08:08

## 2018-02-20 RX ADMIN — HYDRALAZINE HYDROCHLORIDE 10 MG: 10 TABLET, FILM COATED ORAL at 00:22

## 2018-02-20 RX ADMIN — OXYCODONE HYDROCHLORIDE 5 MG: 5 TABLET ORAL at 15:53

## 2018-02-20 RX ADMIN — MAGNESIUM OXIDE 400 MG: 400 TABLET ORAL at 08:11

## 2018-02-20 RX ADMIN — AMLODIPINE BESYLATE 10 MG: 10 TABLET ORAL at 08:09

## 2018-02-20 RX ADMIN — PANTOPRAZOLE SODIUM 40 MG: 40 TABLET, DELAYED RELEASE ORAL at 08:08

## 2018-02-20 RX ADMIN — SPIRONOLACTONE 12.5 MG: 25 TABLET ORAL at 13:03

## 2018-02-20 RX ADMIN — CARVEDILOL 12.5 MG: 12.5 TABLET, FILM COATED ORAL at 08:09

## 2018-02-20 RX ADMIN — FINASTERIDE 5 MG: 5 TABLET, FILM COATED ORAL at 08:09

## 2018-02-20 ASSESSMENT — PAIN SCALES - GENERAL
PAINLEVEL_OUTOF10: 8
PAINLEVEL_OUTOF10: 0
PAINLEVEL_OUTOF10: 8
PAINLEVEL_OUTOF10: 6

## 2018-02-20 ASSESSMENT — PAIN DESCRIPTION - PROGRESSION: CLINICAL_PROGRESSION: GRADUALLY WORSENING

## 2018-02-20 ASSESSMENT — PAIN DESCRIPTION - ORIENTATION: ORIENTATION: LEFT

## 2018-02-20 ASSESSMENT — PAIN DESCRIPTION - FREQUENCY: FREQUENCY: CONTINUOUS

## 2018-02-20 ASSESSMENT — PAIN DESCRIPTION - LOCATION: LOCATION: LEG;NECK;BACK

## 2018-02-20 ASSESSMENT — PAIN DESCRIPTION - ONSET: ONSET: GRADUAL

## 2018-02-20 ASSESSMENT — PAIN DESCRIPTION - DESCRIPTORS: DESCRIPTORS: ACHING;DISCOMFORT

## 2018-02-20 NOTE — DISCHARGE SUMMARY
adequate analgesia on oral pain medications. He is medically stable to be discharged. Clinical course has improved. I feel the patient can be safely discharged to home with outpatient follow up. Instructions have been given for the patient to return to the ED for any worsening of the symptoms, including but not limited to increased pain, shortness of breath, weakness, or any deterioration of their current condition. Disposition: Home    Condition: Good      Patient stable and ready for discharge home. I have discussed plan of care with patient and they are in understanding. They were instructed to read discharge paperwork. All of their questions and concerns were addressed.      Patient states that they understand the plan and agree with the plan     Time Spent: [address] [facility],   Emergency Medicine Resident Physician

## 2018-02-21 NOTE — PROGRESS NOTES
I evaluated this patient due to elevated blood pressure of 190/68, he is asymptomatic at this time, he is resting comfortably. He denies any headache, changes in vision, numbness, weakness, chest pain, or shortness of breath. Will give 1 time dose of hydralazine 10 mg and re-evaluate.     Electronically signed by Linda Guevara MD on 2/19/18 at 11:53 PM
OBS/CDU   RESIDENT NOTE      Patients PCP is: No primary care provider on file. SUBJECTIVE        No acute events overnight. Has been able to tolerate a full diet without nausea or vomiting. The patient is urinating on their own and is passing flatus. Denies fever, chills, nausea, vomiting, chest pain, shortness of breath, abdominal pain, focal weakness, numbness, tingling, urinary/bowel symptoms, vision changes, visual hallucinations, or headache. PHYSICAL EXAM      General: NAD, AO X 3  Heent: EMOI, PERRL  Neck: SUPPLE, NO JVD  Cardiovascular: Regular borderline bradycardia with heart rate varying between 50s and 60s, S1S2  Pulmonary: CTAB, NO SOB  Abdomen: SOFT, NTTP, ND, +BS  Extremities: +2/4 PULSES DISTAL, NO SWELLING  Neuro:  NO NUMBNESS OR TINGLING  Psych:  MENTATION AT BASELINE, NO SUICIDAL IDEATION. PERTINENT TEST /EXAMS      I have reviewed all available laboratory results. MEDICATIONS CURRENT     gabapentin (NEURONTIN) capsule 100 mg TID   ipratropium-albuterol (DUONEB) nebulizer solution 3 mL Q4H PRN   amLODIPine (NORVASC) tablet 10 mg Daily   apixaban (ELIQUIS) tablet 5 mg BID   carvedilol (COREG) tablet 12.5 mg BID WC   docusate sodium (COLACE) capsule 100 mg BID   ferrous sulfate EC tablet 325 mg BID WC   finasteride (PROSCAR) tablet 5 mg Daily   magnesium oxide (MAG-OX) tablet 400 mg Daily   therapeutic multivitamin-minerals 1 tablet Daily   oxyCODONE (ROXICODONE) immediate release tablet 5 mg Q4H PRN   pantoprazole (PROTONIX) tablet 40 mg QAM AC   sennosides-docusate sodium (SENOKOT-S) 8.6-50 MG tablet 2 tablet Daily   simvastatin (ZOCOR) tablet 5 mg Nightly   sodium chloride flush 0.9 % injection 10 mL 2 times per day   sodium chloride flush 0.9 % injection 10 mL PRN   acetaminophen (TYLENOL) tablet 650 mg Q4H PRN       All medication charted and reviewed. CONSULTS      IP CONSULT TO CARDIOLOGY  IP CONSULT TO NEPHROLOGY    ASSESSMENT/PLAN        Irais Ricci is a 69 y.o.
OBS/CDU   RESIDENT NOTE FOR INPATIENT STATUS      INPATIENT       The Patient Now Meets Inpatient Criteria as of 02/18/18  @ 12:09.     For the Following reasons:    [x]   Severity of Signs/ Symptoms indicate admission need   []   Medical Condition Would be Threatened in lower level of care   []   Diagnostic studies procedures results justify inpatient care   []   Adverse event likely if not inpatient admission   [x]   Pre-existing conditions warrants inpatient care     The patient requires inpatient care for further evaluation of their Syncope and collapse [R55]  Syncope and collapse [R55]     Joslyn Rhoades, DO
tenderness. Neuro:  AAO x 3, No FND. SKIN:  No rashes, good skin turgor. Extremities:  No edema, palpable peripheral pulses, no calf tenderness. Labs:       Recent Labs      02/18/18   1226   WBC  6.4   RBC  3.73*   HGB  11.3*   HCT  36.2*   MCV  97.1   MCH  30.3   MCHC  31.2   RDW  13.9   PLT  164   MPV  10.6      BMP: Recent Labs      02/18/18   1226  02/19/18   0742  02/20/18   0535   NA  141  142  140   K  4.6  4.6  4.1   CL  107  108*  107   CO2  17*  18*  19*   BUN  49*  49*  44*   CREATININE  3.82*  3.48*  2.79*   GLUCOSE  104*  95  99   CALCIUM  8.3*  8.2*  8.1*      Phosphorus:   No results for input(s): PHOS in the last 72 hours. Magnesium:  No results for input(s): MG in the last 72 hours. Albumin:  No results for input(s): LABALBU in the last 72 hours.   BNP:    No results found for: BNP  TANA:    No results found for: TANA  SPEP:  Lab Results   Component Value Date    PROT 7.0 02/28/2016     UPEP:   No results found for: LABPE  C3:   No results found for: C3  C4:   No results found for: C4  MPO ANCA:   No results found for: MPO  PR3 ANCA:   No results found for: PR3  Anti-GBM:   No results found for: GBMABIGG  Hep BsAg:       No results found for: HEPBSAG  Hep C AB:        No results found for: HEPCAB    Urinalysis/Chemistries:      Lab Results   Component Value Date    NITRU NEGATIVE 02/18/2018    COLORU YELLOW 02/18/2018    PHUR 5.0 02/18/2018    WBCUA None 02/18/2018    RBCUA None 02/18/2018    MUCUS NOT REPORTED 02/18/2018    TRICHOMONAS NOT REPORTED 02/18/2018    YEAST NOT REPORTED 02/18/2018    BACTERIA NOT REPORTED 02/18/2018    SPECGRAV 1.008 02/18/2018    LEUKOCYTESUR NEGATIVE 02/18/2018    UROBILINOGEN Normal 02/18/2018    BILIRUBINUR NEGATIVE 02/18/2018    GLUCOSEU NEGATIVE 02/18/2018    KETUA NEGATIVE 02/18/2018    AMORPHOUS NOT REPORTED 02/18/2018     Urine Sodium:     Lab Results   Component Value Date    NATHALIA 67 02/26/2016     Urine Potassium:  No results found for: MERLY  Urine

## 2021-01-07 ENCOUNTER — HOSPITAL ENCOUNTER (OUTPATIENT)
Age: 81
Setting detail: SPECIMEN
Discharge: HOME | End: 2021-01-07
Payer: OTHER GOVERNMENT

## 2021-01-08 LAB
ANION GAP SERPL CALCULATED.3IONS-SCNC: 12 MMOL/L (ref 9–17)
BNP INTERPRETATION: ABNORMAL
BUN BLDV-MCNC: 51 MG/DL (ref 8–23)
BUN/CREAT BLD: ABNORMAL (ref 9–20)
CALCIUM SERPL-MCNC: 8.3 MG/DL (ref 8.6–10.4)
CHLORIDE BLD-SCNC: 107 MMOL/L (ref 98–107)
CO2: 20 MMOL/L (ref 20–31)
CREAT SERPL-MCNC: 3.23 MG/DL (ref 0.7–1.2)
GFR AFRICAN AMERICAN: 23 ML/MIN
GFR NON-AFRICAN AMERICAN: 19 ML/MIN
GFR SERPL CREATININE-BSD FRML MDRD: ABNORMAL ML/MIN/{1.73_M2}
GFR SERPL CREATININE-BSD FRML MDRD: ABNORMAL ML/MIN/{1.73_M2}
GLUCOSE BLD-MCNC: 90 MG/DL (ref 70–99)
POTASSIUM SERPL-SCNC: 4.7 MMOL/L (ref 3.7–5.3)
PRO-BNP: 2103 PG/ML
SODIUM BLD-SCNC: 139 MMOL/L (ref 135–144)

## 2022-01-12 NOTE — CARE COORDINATION
Case Management Initial Discharge Plan  Melissa Dorsey,         Readmission Risk              Readmission Risk:        25.75       Age 73 or Greater:  1    Admitted from SNF or Requires Paid or Family Care:  2    Currently has CHF,COPD,ARF,CRI,or is on dialysis:  0    Takes more than 5 Prescription Medications:  4    Takes Digoxin,Insulin,Anticoagulants,Narcotics or ASA/Plavix:  [address] in Past 12 Months:  10    On Disability:  3    Patient Considers own Health:  3.75            Met with:patient to discuss discharge plans. Information verified: address, contacts, phone number, , insurance Yes  PCP: No primary care provider on file. [facility]    Insurance Provider: Medicare    Discharge Planning  Current Residence:  Private Residence (1 step to enter 1 story home)  Living Arrangements:  Spouse/Significant Other, Children   Home has 1 stories/1 stairs to climb  Support Systems:  Spouse/Significant Other, Children, Family Members, Meals on Wheels  Current Services PTA:  [facility], Meals On Wheels Supplier: can, walker, crutches, grab bars  Elevated toilet seat and shower chair. Patient able to perform ADL's:Independent  DME used to aid ambulation prior to admission:     Potential Assistance Needed:  N/A    [facility]   Potential Assistance Purchasing Medications:  No  Does patient want to participate in local refill/ meds to beds program?  N/A    Patient agreeable to home care: Yes, if needed, states he uses [facility] in the past.      Type of Home Care Services:  PT, Nursing Services  Patient expects to be discharged to:  return to home wit current services.     Prior SNF/Rehab Placement and Facility:   Agreeable to SNF/Rehab: No  [place] of choice provided: no   Evaluation: no    Expected Discharge date:  18  Follow Up Appointment: Best Day/ Time:      Transportation provider: self  Transportation arrangements needed for
Met with patient to discuss discharge planning. Patient denies home care needs.   Home with family support    Discharge [facility]  Written by: Rj Hart RN    Patient Name: Laurel Bryant  Attending Provider: Davonte Livingston MD  Admit Date: 2018 12:09 PM  MRN: [id number]  Account: [de-identified]                     : 1940  Discharge Date: 18      Disposition: home    Rj Hart RN
12-Jan-2022 18:34

## 2022-09-27 ENCOUNTER — TELEPHONE (OUTPATIENT)
Dept: PODIATRY | Age: 82
End: 2022-09-27

## 2022-09-27 NOTE — TELEPHONE ENCOUNTER
TRIED TO CALL PATIENT BACK DUE TO NO CLINIC FOR Friday TRYING TO PUT PATIENT ON THE SCHEDULE NEXT WEEK ON Wednesday OR Friday (DO NOT PUT PATIENT IN AT 3:15PM)

## 2022-10-07 ENCOUNTER — OFFICE VISIT (OUTPATIENT)
Dept: PODIATRY | Age: 82
End: 2022-10-07
Payer: OTHER GOVERNMENT

## 2022-10-07 VITALS
HEART RATE: 70 BPM | SYSTOLIC BLOOD PRESSURE: 160 MMHG | DIASTOLIC BLOOD PRESSURE: 88 MMHG | BODY MASS INDEX: 31.71 KG/M2 | WEIGHT: 202 LBS | HEIGHT: 67 IN

## 2022-10-07 DIAGNOSIS — E11.69 ONYCHOMYCOSIS OF MULTIPLE TOENAILS WITH TYPE 2 DIABETES MELLITUS (HCC): ICD-10-CM

## 2022-10-07 DIAGNOSIS — B35.1 TINEA UNGUIUM: Primary | ICD-10-CM

## 2022-10-07 DIAGNOSIS — B35.1 ONYCHOMYCOSIS OF MULTIPLE TOENAILS WITH TYPE 2 DIABETES MELLITUS (HCC): ICD-10-CM

## 2022-10-07 PROBLEM — B19.20 HEPATITIS C: Status: ACTIVE | Noted: 2022-10-07

## 2022-10-07 PROCEDURE — G8484 FLU IMMUNIZE NO ADMIN: HCPCS

## 2022-10-07 PROCEDURE — 1123F ACP DISCUSS/DSCN MKR DOCD: CPT

## 2022-10-07 PROCEDURE — 11721 DEBRIDE NAIL 6 OR MORE: CPT

## 2022-10-07 PROCEDURE — 11720 DEBRIDE NAIL 1-5: CPT

## 2022-10-07 PROCEDURE — 1036F TOBACCO NON-USER: CPT

## 2022-10-07 PROCEDURE — 99203 OFFICE O/P NEW LOW 30 MIN: CPT

## 2022-10-07 PROCEDURE — G8427 DOCREV CUR MEDS BY ELIG CLIN: HCPCS

## 2022-10-07 PROCEDURE — G8419 CALC BMI OUT NRM PARAM NOF/U: HCPCS

## 2022-10-07 NOTE — LETTER
Thank you for letting us participate in your care.     Please follow up with your PCP to see if the [facility] will provide you with custom orthotics     Maik Erazo DPM

## 2022-10-07 NOTE — PATIENT INSTRUCTIONS
Thank you for letting us participate in your care.     Please follow up with your PCP to see if the [facility] will provide you with custom orthotics     Tyler Roblero DPM

## 2022-10-10 NOTE — PROGRESS NOTES
[address] [facility] 19 039 [address] [facility],  S Rj Kaur  Tel: [phone]   Fax: [phone]    Subjective     CC: Painful elongated nails    HPI:  Donovan Escobar is a 81y.o. year old male who presents to clinic today for elongated nails. Patient has had pain and difficulty trimming his nails. Patient is NOT diabetic and has neuropathy he claims he got from the [employer]. Patient follows up with his local [place] and was sent here to clinic. Patient is taking neuropathy medication and is otherwise well controlled. No constitutional symptoms today  Primary care physician is No primary care provider on file. .    ROS:    Constitutional: Denies nausea, vomiting, fever, chills. Neurologic: Denies numbness, tingling, and burning in the feet. Vascular: Denies symptoms of lower extremity claudication. Skin: Denies open wounds. Otherwise negative except as noted in the HPI. PMH:  Past Medical History:   Diagnosis Date    Atrial fibrillation, chronic (HCC)     BPH (benign prostatic hyperplasia)     Hyperlipidemia     Hypertension     Myocardial infarct, old        Surgical History:   No past surgical history on file. Social History:  Social History     Tobacco Use    Smoking status: Former    Smokeless tobacco: Never   Substance Use Topics    Alcohol use: No    Drug use: No       Medications:  Prior to Admission medications    Medication Sig Start Date End Date Taking? Authorizing Provider   ciclopirox (PENLAC) 8 % solution Apply topically nightly. Remove once weekly with alcohol or nail polish remover.  10/7/22  Yes Bryant Zee DPM   spironolactone (ALDACTONE) 25 MG tablet Take 0.5 tablets by mouth daily 2/20/18   Perfecto Talley DO   carvedilol (COREG) 6.25 MG tablet Take 1 tablet by mouth 2 times daily (with meals) 2/20/18   Perfecto Talley DO   aspirin 81 MG tablet Take 81 mg by mouth daily    Historical Provider, MD   cloZAPine (CLOZARIL) 25 MG tablet Take 25 mg by mouth 2 times daily    Historical Provider, MD   oxyCODONE (ROXICODONE) 5 MG immediate release tablet Take 1 tablet by mouth every 4 hours as needed for Pain 3/2/16   Silvestre Malloy, MD   ferrous sulfate 325 (65 FE) MG tablet Take 1 tablet by mouth 2 times daily (with meals) 3/2/16   Silvestre Malloy MD   amLODIPine (NORVASC) 10 MG tablet Take 10 mg by mouth daily    Historical Provider, MD   brimonidine (ALPHAGAN P) 0.1 % SOLN 1 drop 2 times daily    Historical Provider, MD   finasteride (PROSCAR) 5 MG tablet Take 5 mg by mouth daily    Historical Provider, MD   gabapentin (NEURONTIN) 300 MG capsule Take 300 mg by mouth 3 times daily    Historical Provider, MD   magnesium oxide (MAG-OX) 400 MG tablet Take 400 mg by mouth daily    Historical Provider, MD   latanoprost (XALATAN) 0.005 % ophthalmic solution 1 drop nightly    Historical Provider, MD   Multiple Vitamins-Minerals (THERAPEUTIC MULTIVITAMIN-MINERALS) tablet Take 1 tablet by mouth daily    Historical Provider, MD   pantoprazole sodium (PROTONIX) 40 MG PACK packet Take 40 mg by mouth every morning (before breakfast)    Historical Provider, MD   senna-docusate (Stann Forward) 8.6-50 MG per tablet Take 2 tablets by mouth daily    Historical Provider, MD   ipratropium-albuterol (DUONEB) 0.5-2.5 (3) MG/3ML SOLN nebulizer solution Take 1 vial by nebulization every 4 hours    Historical Provider, MD   simvastatin (ZOCOR) 5 MG tablet Take 5 mg by mouth nightly    Historical Provider, MD   docusate sodium (COLACE) 100 MG capsule Take 100 mg by mouth 2 times daily    Historical Provider, MD   HYDROPHILIC EX Apply topically    Historical Provider, MD   terbinafine (LAMISIL) 1 % cream Apply topically 2 times daily Apply topically 2 times daily.     Historical Provider, MD   hydrochlorothiazide (HYDRODIURIL) 25 MG tablet Take 25 mg by mouth daily    Historical Provider, MD       Objective     Vitals:    10/07/22 1319   BP: (!) 160/88   Pulse: 70       Lab Results   Component Value Date LABA1C 5.5 02/26/2016       Physical Exam:  General:  Alert and oriented x3. In no acute distress. Lower Extremity Physical Exam:    Vascular: DP and PT pulses are palpable, Bilateral.  Dopplerable pulses bilateral.CFT <3 seconds to all digits, Bilateral.  No edema, Bilateral.  Hair growth is absent to the level of the digits, Bilateral.     Neuro: Saph/sural/SP/DP/plantar sensation intact to light touch. Protective sensation is intact to 6/10 sites as tested with a 5.07g SWMF, Bilateral.     Musculoskeletal: EHL/FHL/GS/TA gross motor intact. Tenderness to palpation of both foot and right. Gross deformity is absent, Bilateral.     Dermatologic: No open lesions, Bilateral.  Interdigital maceration absent, Bilateral.  Nails 1-10 are elongated and significantly dystrophic. Biomechanical Exam:    Right foot: 1st MTPJ: Loaded: 35 unloaded: 45  Right foot: 1st Ray: 3 mm of dorsal and plantar excursion      Right foot: Ankle DF knee extended: 10  Right foot: Ankle DF knee flexed: 15     Left foot: 1st MTPJ: Loaded: 35 unloaded: 45  Left foot: 1st Ray: 3 mm of dorsal and plantar excursion  Left foot: Ankle DF knee extended: 10  Left foot: Ankle DF knee flexed: 15    Gait Analysis: Within normal limits, no signs of longitudinal arch drop, normal pushoff, no abnormality in gait. Imaging: None    Assessment   Paris Maldonado is a 81 y.o. male with     Diagnosis Orders   1. Tinea unguium             Plan   Patient examined and evaluated  Diagnosis and treatment options discussed in detail  Extensive discussion had with the patient regarding the complex nature of onychomycosis and etiology of neuropathy as well as lymphedema/venous stasis and the importance of management to prevent wounds. Educated the patient on regular foot care, peripheral neuropathy, signs of infection, and peripheral arterial disease. All questions were answered to his apparent satisfaction. Patient demonstrates and verbalizes understanding. Nails 1 through 5 bilateral trimmed without incident  Educated patient to follow-up with the [place] for further care and come back with any future problems as recurrent nail debridement would not be covered because patient does not have diabetes  Patient to RTC as needed please, call the office with any questions or concerns     Orders Placed This Encounter   Medications    ciclopirox (PENLAC) 8 % solution     Sig: Apply topically nightly. Remove once weekly with alcohol or nail polish remover. Dispense:  6 mL     Refill:  1     No orders of the defined types were placed in this encounter.       Sumit Marroquin DPM   Podiatric Medicine & Surgery   10/10/2022 at 3:53 PM

## 2023-06-21 ENCOUNTER — OFFICE VISIT (OUTPATIENT)
Dept: PODIATRY | Age: 83
End: 2023-06-21
Payer: OTHER GOVERNMENT

## 2023-06-21 VITALS
BODY MASS INDEX: 31.71 KG/M2 | SYSTOLIC BLOOD PRESSURE: 140 MMHG | HEIGHT: 67 IN | DIASTOLIC BLOOD PRESSURE: 70 MMHG | HEART RATE: 67 BPM | WEIGHT: 202 LBS

## 2023-06-21 DIAGNOSIS — B35.1 PAIN DUE TO ONYCHOMYCOSIS OF NAIL: Primary | ICD-10-CM

## 2023-06-21 DIAGNOSIS — G60.9 HEREDITARY AND IDIOPATHIC NEUROPATHY: ICD-10-CM

## 2023-06-21 DIAGNOSIS — M79.609 PAIN DUE TO ONYCHOMYCOSIS OF NAIL: Primary | ICD-10-CM

## 2023-06-21 PROCEDURE — 1123F ACP DISCUSS/DSCN MKR DOCD: CPT | Performed by: PODIATRIST

## 2023-06-21 PROCEDURE — 99213 OFFICE O/P EST LOW 20 MIN: CPT | Performed by: PODIATRIST
